# Patient Record
Sex: MALE
[De-identification: names, ages, dates, MRNs, and addresses within clinical notes are randomized per-mention and may not be internally consistent; named-entity substitution may affect disease eponyms.]

---

## 2023-03-08 ENCOUNTER — NURSE TRIAGE (OUTPATIENT)
Dept: OTHER | Facility: CLINIC | Age: 28
End: 2023-03-08

## 2023-03-08 NOTE — TELEPHONE ENCOUNTER
Caller stated he is currently in [place].     RN not licensed in [place].      Advised will see if a RN is available that can take his call.      No triage RN licensed in [place] is available.      Jeremiah aware and asking if he can get some name so PCP's in the [place] area that he can call to get established.     Provided caller with several names and numbers from O website provider search.     Reason for Disposition   General information question, no triage required and triager able to answer question    Protocols used: Information Only Call - No Triage-ADULT-

## 2024-07-14 ENCOUNTER — HOSPITAL ENCOUNTER (EMERGENCY)
Facility: HOSPITAL | Age: 29
Discharge: HOME | End: 2024-07-14
Attending: STUDENT IN AN ORGANIZED HEALTH CARE EDUCATION/TRAINING PROGRAM
Payer: COMMERCIAL

## 2024-07-14 ENCOUNTER — APPOINTMENT (OUTPATIENT)
Dept: CARDIOLOGY | Facility: HOSPITAL | Age: 29
End: 2024-07-14
Payer: COMMERCIAL

## 2024-07-14 VITALS
SYSTOLIC BLOOD PRESSURE: 133 MMHG | TEMPERATURE: 97.9 F | BODY MASS INDEX: 23.36 KG/M2 | DIASTOLIC BLOOD PRESSURE: 84 MMHG | HEIGHT: 74 IN | RESPIRATION RATE: 18 BRPM | OXYGEN SATURATION: 97 % | HEART RATE: 74 BPM | WEIGHT: 182 LBS

## 2024-07-14 DIAGNOSIS — F10.930 ALCOHOL WITHDRAWAL SYNDROME WITHOUT COMPLICATION (MULTI): ICD-10-CM

## 2024-07-14 DIAGNOSIS — F12.10 CANNABIS ABUSE: ICD-10-CM

## 2024-07-14 DIAGNOSIS — R25.1 TREMORS OF NERVOUS SYSTEM: ICD-10-CM

## 2024-07-14 DIAGNOSIS — F10.10 ALCOHOL ABUSE: Primary | ICD-10-CM

## 2024-07-14 LAB
ALBUMIN SERPL-MCNC: 4.4 G/DL (ref 3.5–5)
ALP BLD-CCNC: 66 U/L (ref 35–125)
ALT SERPL-CCNC: 31 U/L (ref 5–40)
AMPHETAMINES UR QL SCN>1000 NG/ML: NEGATIVE
ANION GAP SERPL CALC-SCNC: 16 MMOL/L
APAP SERPL-MCNC: <5 UG/ML
APPEARANCE UR: CLEAR
AST SERPL-CCNC: 35 U/L (ref 5–40)
BARBITURATES UR QL SCN>300 NG/ML: NEGATIVE
BASOPHILS # BLD AUTO: 0.03 X10*3/UL (ref 0–0.1)
BASOPHILS NFR BLD AUTO: 0.2 %
BENZODIAZ UR QL SCN>300 NG/ML: NEGATIVE
BILIRUB SERPL-MCNC: 0.5 MG/DL (ref 0.1–1.2)
BILIRUB UR STRIP.AUTO-MCNC: NEGATIVE MG/DL
BUN SERPL-MCNC: 12 MG/DL (ref 8–25)
BZE UR QL SCN>300 NG/ML: NEGATIVE
CALCIUM SERPL-MCNC: 9.5 MG/DL (ref 8.5–10.4)
CANNABINOIDS UR QL SCN>50 NG/ML: POSITIVE
CHLORIDE SERPL-SCNC: 102 MMOL/L (ref 97–107)
CO2 SERPL-SCNC: 23 MMOL/L (ref 24–31)
COLOR UR: NORMAL
CREAT SERPL-MCNC: 0.9 MG/DL (ref 0.4–1.6)
EGFRCR SERPLBLD CKD-EPI 2021: >90 ML/MIN/1.73M*2
EOSINOPHIL # BLD AUTO: 0 X10*3/UL (ref 0–0.7)
EOSINOPHIL NFR BLD AUTO: 0 %
ERYTHROCYTE [DISTWIDTH] IN BLOOD BY AUTOMATED COUNT: 12.3 % (ref 11.5–14.5)
ETHANOL SERPL-MCNC: <0.01 G/DL
ETHANOL SERPL-MCNC: <0.01 G/DL
FENTANYL+NORFENTANYL UR QL SCN: NEGATIVE
GLUCOSE SERPL-MCNC: 113 MG/DL (ref 65–99)
GLUCOSE UR STRIP.AUTO-MCNC: NORMAL MG/DL
HCT VFR BLD AUTO: 40.9 % (ref 41–52)
HGB BLD-MCNC: 14.2 G/DL (ref 13.5–17.5)
IMM GRANULOCYTES # BLD AUTO: 0.05 X10*3/UL (ref 0–0.7)
IMM GRANULOCYTES NFR BLD AUTO: 0.4 % (ref 0–0.9)
KETONES UR STRIP.AUTO-MCNC: NEGATIVE MG/DL
LEUKOCYTE ESTERASE UR QL STRIP.AUTO: NEGATIVE
LIPASE SERPL-CCNC: <16 U/L (ref 16–63)
LYMPHOCYTES # BLD AUTO: 0.53 X10*3/UL (ref 1.2–4.8)
LYMPHOCYTES NFR BLD AUTO: 4 %
MCH RBC QN AUTO: 30.1 PG (ref 26–34)
MCHC RBC AUTO-ENTMCNC: 34.7 G/DL (ref 32–36)
MCV RBC AUTO: 87 FL (ref 80–100)
METHADONE UR QL SCN>300 NG/ML: NEGATIVE
MONOCYTES # BLD AUTO: 1.04 X10*3/UL (ref 0.1–1)
MONOCYTES NFR BLD AUTO: 7.9 %
NEUTROPHILS # BLD AUTO: 11.48 X10*3/UL (ref 1.2–7.7)
NEUTROPHILS NFR BLD AUTO: 87.5 %
NITRITE UR QL STRIP.AUTO: NEGATIVE
NRBC BLD-RTO: 0 /100 WBCS (ref 0–0)
OPIATES UR QL SCN>300 NG/ML: NEGATIVE
OXYCODONE UR QL: NEGATIVE
PCP UR QL SCN>25 NG/ML: NEGATIVE
PH UR STRIP.AUTO: 8 [PH]
PLATELET # BLD AUTO: 241 X10*3/UL (ref 150–450)
POTASSIUM SERPL-SCNC: 3.9 MMOL/L (ref 3.4–5.1)
PROT SERPL-MCNC: 6.9 G/DL (ref 5.9–7.9)
PROT UR STRIP.AUTO-MCNC: NEGATIVE MG/DL
RBC # BLD AUTO: 4.72 X10*6/UL (ref 4.5–5.9)
RBC # UR STRIP.AUTO: NEGATIVE /UL
SALICYLATES SERPL-MCNC: <0 MG/DL
SODIUM SERPL-SCNC: 141 MMOL/L (ref 133–145)
SP GR UR STRIP.AUTO: 1.02
UROBILINOGEN UR STRIP.AUTO-MCNC: NORMAL MG/DL
WBC # BLD AUTO: 13.1 X10*3/UL (ref 4.4–11.3)

## 2024-07-14 PROCEDURE — 2500000004 HC RX 250 GENERAL PHARMACY W/ HCPCS (ALT 636 FOR OP/ED): Performed by: STUDENT IN AN ORGANIZED HEALTH CARE EDUCATION/TRAINING PROGRAM

## 2024-07-14 PROCEDURE — 36415 COLL VENOUS BLD VENIPUNCTURE: CPT

## 2024-07-14 PROCEDURE — 80143 DRUG ASSAY ACETAMINOPHEN: CPT

## 2024-07-14 PROCEDURE — 93005 ELECTROCARDIOGRAM TRACING: CPT

## 2024-07-14 PROCEDURE — 2500000002 HC RX 250 W HCPCS SELF ADMINISTERED DRUGS (ALT 637 FOR MEDICARE OP, ALT 636 FOR OP/ED): Performed by: STUDENT IN AN ORGANIZED HEALTH CARE EDUCATION/TRAINING PROGRAM

## 2024-07-14 PROCEDURE — 85025 COMPLETE CBC W/AUTO DIFF WBC: CPT | Performed by: STUDENT IN AN ORGANIZED HEALTH CARE EDUCATION/TRAINING PROGRAM

## 2024-07-14 PROCEDURE — 99284 EMERGENCY DEPT VISIT MOD MDM: CPT | Mod: 25

## 2024-07-14 PROCEDURE — 2500000001 HC RX 250 WO HCPCS SELF ADMINISTERED DRUGS (ALT 637 FOR MEDICARE OP)

## 2024-07-14 PROCEDURE — 96375 TX/PRO/DX INJ NEW DRUG ADDON: CPT

## 2024-07-14 PROCEDURE — 82077 ASSAY SPEC XCP UR&BREATH IA: CPT | Performed by: STUDENT IN AN ORGANIZED HEALTH CARE EDUCATION/TRAINING PROGRAM

## 2024-07-14 PROCEDURE — 81003 URINALYSIS AUTO W/O SCOPE: CPT | Performed by: STUDENT IN AN ORGANIZED HEALTH CARE EDUCATION/TRAINING PROGRAM

## 2024-07-14 PROCEDURE — 80053 COMPREHEN METABOLIC PANEL: CPT | Performed by: STUDENT IN AN ORGANIZED HEALTH CARE EDUCATION/TRAINING PROGRAM

## 2024-07-14 PROCEDURE — 36415 COLL VENOUS BLD VENIPUNCTURE: CPT | Performed by: STUDENT IN AN ORGANIZED HEALTH CARE EDUCATION/TRAINING PROGRAM

## 2024-07-14 PROCEDURE — 83690 ASSAY OF LIPASE: CPT | Performed by: STUDENT IN AN ORGANIZED HEALTH CARE EDUCATION/TRAINING PROGRAM

## 2024-07-14 PROCEDURE — 96374 THER/PROPH/DIAG INJ IV PUSH: CPT

## 2024-07-14 PROCEDURE — 96361 HYDRATE IV INFUSION ADD-ON: CPT

## 2024-07-14 PROCEDURE — 2500000004 HC RX 250 GENERAL PHARMACY W/ HCPCS (ALT 636 FOR OP/ED)

## 2024-07-14 PROCEDURE — 80307 DRUG TEST PRSMV CHEM ANLYZR: CPT | Performed by: STUDENT IN AN ORGANIZED HEALTH CARE EDUCATION/TRAINING PROGRAM

## 2024-07-14 RX ORDER — PHENOBARBITAL 32.4 MG/1
130 TABLET ORAL ONCE
Status: COMPLETED | OUTPATIENT
Start: 2024-07-14 | End: 2024-07-14

## 2024-07-14 RX ORDER — FOLIC ACID 1 MG/1
1 TABLET ORAL DAILY
Status: DISCONTINUED | OUTPATIENT
Start: 2024-07-14 | End: 2024-07-14 | Stop reason: HOSPADM

## 2024-07-14 RX ORDER — FAMOTIDINE 10 MG/ML
20 INJECTION INTRAVENOUS ONCE
Status: COMPLETED | OUTPATIENT
Start: 2024-07-14 | End: 2024-07-14

## 2024-07-14 RX ORDER — LORAZEPAM 2 MG/ML
2 INJECTION INTRAMUSCULAR EVERY 2 HOUR PRN
Status: DISCONTINUED | OUTPATIENT
Start: 2024-07-14 | End: 2024-07-14 | Stop reason: HOSPADM

## 2024-07-14 RX ORDER — SUCRALFATE 1 G/10ML
1 SUSPENSION ORAL EVERY 6 HOURS SCHEDULED
Status: DISCONTINUED | OUTPATIENT
Start: 2024-07-14 | End: 2024-07-14 | Stop reason: HOSPADM

## 2024-07-14 RX ORDER — ONDANSETRON HYDROCHLORIDE 2 MG/ML
4 INJECTION, SOLUTION INTRAVENOUS ONCE
Status: COMPLETED | OUTPATIENT
Start: 2024-07-14 | End: 2024-07-14

## 2024-07-14 RX ORDER — LANOLIN ALCOHOL/MO/W.PET/CERES
100 CREAM (GRAM) TOPICAL DAILY
Status: DISCONTINUED | OUTPATIENT
Start: 2024-07-14 | End: 2024-07-14 | Stop reason: HOSPADM

## 2024-07-14 RX ORDER — LORAZEPAM 2 MG/ML
0.5 INJECTION INTRAMUSCULAR EVERY 2 HOUR PRN
Status: DISCONTINUED | OUTPATIENT
Start: 2024-07-14 | End: 2024-07-14 | Stop reason: HOSPADM

## 2024-07-14 RX ORDER — LORAZEPAM 2 MG/ML
1 INJECTION INTRAMUSCULAR EVERY 2 HOUR PRN
Status: DISCONTINUED | OUTPATIENT
Start: 2024-07-14 | End: 2024-07-14 | Stop reason: HOSPADM

## 2024-07-14 RX ORDER — MULTIVIT-MIN/IRON FUM/FOLIC AC 7.5 MG-4
1 TABLET ORAL DAILY
Status: DISCONTINUED | OUTPATIENT
Start: 2024-07-14 | End: 2024-07-14 | Stop reason: HOSPADM

## 2024-07-14 SDOH — HEALTH STABILITY: MENTAL HEALTH: HAVE YOU WISHED YOU WERE DEAD OR WISHED YOU COULD GO TO SLEEP AND NOT WAKE UP?: NO

## 2024-07-14 SDOH — HEALTH STABILITY: MENTAL HEALTH: HAVE YOU ACTUALLY HAD ANY THOUGHTS OF KILLING YOURSELF?: NO

## 2024-07-14 SDOH — HEALTH STABILITY: MENTAL HEALTH: SUICIDE ASSESSMENT: ADULT (C-SSRS)

## 2024-07-14 SDOH — HEALTH STABILITY: MENTAL HEALTH: BEHAVIORS/MOOD: ANXIOUS

## 2024-07-14 SDOH — HEALTH STABILITY: MENTAL HEALTH: HAVE YOU EVER DONE ANYTHING, STARTED TO DO ANYTHING, OR PREPARED TO DO ANYTHING TO END YOUR LIFE?: NO

## 2024-07-14 ASSESSMENT — PAIN DESCRIPTION - LOCATION: LOCATION: ABDOMEN

## 2024-07-14 ASSESSMENT — LIFESTYLE VARIABLES
PAROXYSMAL SWEATS: BARELY PERCEPTIBLE SWEATING, PALMS MOIST
ANXIETY: MODERATELY ANXIOUS, OR GUARDED, SO ANXIETY IS INFERRED
VISUAL DISTURBANCES: NOT PRESENT
TOTAL SCORE: 10
TREMOR: 2
TOTAL SCORE: 5
AGITATION: NORMAL ACTIVITY
AUDITORY DISTURBANCES: NOT PRESENT
NAUSEA AND VOMITING: MILD NAUSEA WITH NO VOMITING
HEADACHE, FULLNESS IN HEAD: MILD
AGITATION: NORMAL ACTIVITY
ORIENTATION AND CLOUDING OF SENSORIUM: ORIENTED AND CAN DO SERIAL ADDITIONS
NAUSEA AND VOMITING: MILD NAUSEA WITH NO VOMITING
TACTILE DISTURBANCES: VERY MILD ITCHING, PINS AND NEEDLES, BURNING OR NUMBNESS
TREMOR: NOT VISIBLE, BUT CAN BE FELT FINGERTIP TO FINGERTIP
AUDITORY DISTURBANCES: NOT PRESENT
HEADACHE, FULLNESS IN HEAD: NOT PRESENT
PAROXYSMAL SWEATS: NO SWEAT VISIBLE
ANXIETY: 2
ORIENTATION AND CLOUDING OF SENSORIUM: ORIENTED AND CAN DO SERIAL ADDITIONS
VISUAL DISTURBANCES: NOT PRESENT

## 2024-07-14 ASSESSMENT — COLUMBIA-SUICIDE SEVERITY RATING SCALE - C-SSRS
1. IN THE PAST MONTH, HAVE YOU WISHED YOU WERE DEAD OR WISHED YOU COULD GO TO SLEEP AND NOT WAKE UP?: NO
2. HAVE YOU ACTUALLY HAD ANY THOUGHTS OF KILLING YOURSELF?: NO
6. HAVE YOU EVER DONE ANYTHING, STARTED TO DO ANYTHING, OR PREPARED TO DO ANYTHING TO END YOUR LIFE?: NO

## 2024-07-14 ASSESSMENT — PAIN SCALES - GENERAL: PAINLEVEL_OUTOF10: 4

## 2024-07-14 ASSESSMENT — PAIN - FUNCTIONAL ASSESSMENT: PAIN_FUNCTIONAL_ASSESSMENT: 0-10

## 2024-07-14 NOTE — PROGRESS NOTES
Social Work Note  11:20 Pt is a 29 y/o M presenting to [facility] ED for alcohol withdrawal complaints. Pt has history of alcohol abuse and reports last drink was yesterday afternoon around 14:00. Pt states he typically drinks a 12 pack of seltzers per day or vodka depending on what is available. His current symptoms are nausea, abdominal pain, sweating and anxiety. Pt also endorsing some tremors at this time. Pt reports he has been drinking for several years and has gone through inpatient treatment in [place] 2 months ago. He has been binge drinking for the past 6 days and now having withdrawal symptoms. Pt from [place] but stays with his parents in [place] when back in [facility]. He is unsure at this time if he is interested in inpatient treatment as he has financial stressors and does not want to miss a week's worth of work. EPAT provided pt with options for treatment including inpatient and outpatient resources which can be provided if pt decides to go home. EPAT will follow up with pt once a decision has been made. Per RN pt was provided fluids and medications to help with his symptoms at this time. Pt's CIWA score currently at 6.     12:45 EPAT spoke with pt again regarding options and plan of care. Pt is not interested in inpatient detox at this time and denies wanting any resources for outpatient stating he is going to get himself back to [place] for work and figure out outpatient treatment options when he gets there.   EPAT spoke with ED providers who are in agreement to discharge pt.

## 2024-07-14 NOTE — ED PROVIDER NOTES
HPI   Chief Complaint   Patient presents with    Alcohol Problem     Pt comes in reporting etoh WD, last drink was at 1400 yesterday (7/14), has been binge drinking for the last 6 days. Went to treatment 2 months ago, is not sure about inpt treatment today. Reports nausea and abd pain. Sweating and anxious. Denies SI/HI. Patient admits to a 12 pack of seltzers per day and some vodka.        Patient is a 28-year-old male with past medical history of alcohol abuse presenting with concerns for alcohol withdrawal.  States that he has been binge drinking for the last 6 days.  States his typical drink is either vodka or seltzers.  Endorses roughly a 12 pack of seltzers every day.  Currently endorsing epigastric abdominal pain with nausea and vomiting.  He states that he was inpatient for his alcohol problem roughly 2 months ago.  States he is from [place] and is unsure if he would like to go inpatient at this time.  Denies SI, HI, auditory, visual hallucinations.  Does endorse tremors.  Patient denies fevers, chills, cough, sore throat, runny nose, chest pain, shortness of breath, diarrhea or urinary complaints.                          [place] Coma Scale Score: 15                     Patient History   History reviewed. No pertinent past medical history.  History reviewed. No pertinent surgical history.  No family history on file.  Social History     Tobacco Use    Smoking status: Never    Smokeless tobacco: Never   Substance Use Topics    Alcohol use: Yes     Alcohol/week: 14.0 standard drinks of alcohol     Types: 12 Cans of beer, 2 Shots of liquor per week    Drug use: Yes     Types: Marijuana       Physical Exam   ED Triage Vitals [07/14/24 1012]   Temperature Heart Rate Respirations BP   36.6 °C (97.9 °F) 71 18 146/81      Pulse Ox Temp Source Heart Rate Source Patient Position   98 % Oral -- --      BP Location FiO2 (%)     -- --       Physical Exam  Vitals and nursing note reviewed.   Constitutional:        Appearance: He is well-developed.      Comments: Awake, laying in bed, mild tremors present   HENT:      Head: Normocephalic and atraumatic.      Nose: Nose normal.      Mouth/Throat:      Mouth: Mucous membranes are moist.      Pharynx: Oropharynx is clear.   Eyes:      Extraocular Movements: Extraocular movements intact.      Conjunctiva/sclera: Conjunctivae normal.      Pupils: Pupils are equal, round, and reactive to light.   Cardiovascular:      Rate and Rhythm: Normal rate and regular rhythm.      Pulses: Normal pulses.      Heart sounds: Normal heart sounds. No murmur heard.  Pulmonary:      Effort: Pulmonary effort is normal. No respiratory distress.      Breath sounds: Normal breath sounds.   Abdominal:      General: Abdomen is flat.      Palpations: Abdomen is soft.      Tenderness: There is abdominal tenderness.      Comments: Tenderness palpation of the epigastrium   Musculoskeletal:         General: No swelling. Normal range of motion.      Cervical back: Normal range of motion and neck supple.   Skin:     General: Skin is warm and dry.      Capillary Refill: Capillary refill takes less than 2 seconds.   Neurological:      General: No focal deficit present.      Mental Status: He is alert and oriented to person, place, and time.   Psychiatric:         Mood and Affect: Mood normal.         Behavior: Behavior normal.         ED Course & MDM   ED Course as of 07/14/24 1715   Sun Jul 14, 2024   1032 I personally reviewed and interpreted the EKG @1027: NSR 68, normal axis/intervals and no appreciable ischemia, and no prior EKG available for review. [BC]      ED Course User Index  [BC] Diony Laura MD         Diagnoses as of 07/14/24 1715   Alcohol abuse   Alcohol withdrawal syndrome without complication (Multi)   Tremors of nervous system   Cannabis abuse       Medical Decision Making  Patient is a 28-year-old male with past med history of alcohol abuse presenting with concerns for alcohol withdrawal.  " Psychiatric workup, [facility] protocol ordered.  IV fluids, Zofran ordered.  Social work consult placed.  Conditions considered include but not limited to: Alcohol withdrawal, substance withdrawal, alcohol intoxication.    I saw this patient in conjunction with Dr. Laura.  CBC does show leukocytosis with WBCs of 13.1 but is without signs of anemia.  CMP without significant electrolyte abnormality or renal impairment.  Ethanol level is within normal limits.  UA with micro is without infection.  Urine drug screen is positive for cannabinoid use.  Lipase within normal limits.  Acute toxicology panel is within normal limits.    Patient was seen by social work and the patient states that he does not wish to go inpatient for detox.  He wishes to be discharged.  Due to patient's normal ethanol level, patient can safely be discharged.  We did give phenobarbital in the emergency department noted strong encourage this patient to refrain from drinking as he states he has had withdrawal seizures in the past.     A disoposition of discharge is acceptable.  Return to the Emergency Department if new or worsening symptoms including headache, fever, chills, chest pain, shortness of breath, syncope, near syncope, abdominal pain, nausea, vomiting,  diarrhea, or worsening pain.  Patient is agreeable to disposition of discharge with follow-up with primary care in the next 1-2 days.  He states that he is going to attempt to refrain from drinking as he states the symptoms of intoxication and withdrawal are \"not worth\".    Portions of this note made with Dragon software, please be mindful of potential grammatical errors.        Medications   sodium chloride 0.9 % bolus 1,000 mL (0 mL intravenous Stopped 7/14/24 1121)   ondansetron (Zofran) injection 4 mg (4 mg intravenous Given 7/14/24 1028)   sodium chloride 0.9 % bolus 1,000 mL (0 mL intravenous Stopped 7/14/24 1121)   famotidine PF (Pepcid) injection 20 mg (20 mg intravenous Given " 7/14/24 1129)   PHENobarbitaL (Luminal) tablet 130 mg (130 mg oral Given 7/14/24 1331)         Labs Reviewed   CBC WITH AUTO DIFFERENTIAL - Abnormal       Result Value    WBC 13.1 (*)     nRBC 0.0      RBC 4.72      Hemoglobin 14.2      Hematocrit 40.9 (*)     MCV 87      MCH 30.1      MCHC 34.7      RDW 12.3      Platelets 241      Neutrophils % 87.5      Immature Granulocytes %, Automated 0.4      Lymphocytes % 4.0      Monocytes % 7.9      Eosinophils % 0.0      Basophils % 0.2      Neutrophils Absolute 11.48 (*)     Immature Granulocytes Absolute, Automated 0.05      Lymphocytes Absolute 0.53 (*)     Monocytes Absolute 1.04 (*)     Eosinophils Absolute 0.00      Basophils Absolute 0.03     COMPREHENSIVE METABOLIC PANEL - Abnormal    Glucose 113 (*)     Sodium 141      Potassium 3.9      Chloride 102      Bicarbonate 23 (*)     Urea Nitrogen 12      Creatinine 0.90      eGFR >90      Calcium 9.5      Albumin 4.4      Alkaline Phosphatase 66      Total Protein 6.9      AST 35      Bilirubin, Total 0.5      ALT 31      Anion Gap 16     DRUG SCREEN,URINE - Abnormal    Amphetamine Screen, Urine Negative      Barbiturate Screen, Urine Negative      Benzodiazepines Screen, Urine Negative      Cannabinoid Screen, Urine Positive (*)     Cocaine Metabolite Screen, Urine Negative      Fentanyl Screen, Urine Negative      Methadone Screen, Urine Negative      Opiate Screen, Urine Negative      Oxycodone Screen, Urine Negative      PCP Screen, Urine Negative      Narrative:     These toxicological screening tests provide unconfirmed qualitative measurements to aid in treatment and diagnosis in cases of drug use or overdose. This test is used only for medical purposes. A positive result does not indicate or measure intoxication. For specific test performance or pathologist consultation, please contact the Laboratory.    The following threshold concentrations are used for these analyses.Values at or above the threshold  concentration are reported as positive. Values below the threshold are reported as negative.    Drug /Screening Threshold                                                                                                 THC/CANNABINOIDS................50 ng/ml  METHADONE......................300 ng/ml  COCAINE METABOLITES............300 ng/ml  BENZODIAZEPINE.................300 ng/ml  PCP.............................25 ng/ml  OPIATE.........................300 ng/ml  AMPHETAMINE/ECSTASY...........1000 ng/ml  BARBITURATE....................200 ng/ml  OXYCODONE......................100 ng/ml  FENTANYL.........................5 ng/ml       LIPASE - Abnormal    Lipase <16 (*)    URINALYSIS WITH REFLEX MICROSCOPIC - Normal    Color, Urine Light-Yellow      Appearance, Urine Clear      Specific Gravity, Urine 1.017      pH, Urine 8.0      Protein, Urine NEGATIVE      Glucose, Urine Normal      Blood, Urine NEGATIVE      Ketones, Urine NEGATIVE      Bilirubin, Urine NEGATIVE      Urobilinogen, Urine Normal      Nitrite, Urine NEGATIVE      Leukocyte Esterase, Urine NEGATIVE     ALCOHOL - Normal    Alcohol <0.010     ACUTE TOXICOLOGY PANEL, BLOOD - Normal    Acetaminophen <5.0      Salicylate  <0      Alcohol <0.010         Procedure  Procedures     Sanjay Allen PA-C  07/14/24 9566

## 2024-07-14 NOTE — DISCHARGE INSTRUCTIONS
Thank you for allowing myself and the team to take care of you during your emergency situation and addressing your health concerns.    You were evaluated for alcohol withdrawal symptoms.     Please refrain from using any alcohol for the next 24 to 48 hours you are given medications and prevent you from having any alcohol withdrawal seizures.  Please utilize appropriate rehab/detox resources that was provided.      During your evaluation and assessment, all measures were taken to evaluate you and address your health concerns to identify dangerous and life threatening health conditions. It is not possible to identify all health conditions or pathologies and eliminate any chance of unfavorable outcomes while in the Emergency Department. My team encourages you to be vigilant with your health and follow-up with the appropriate providers outlined in your discharge paperwork. Please return to the Emergency Department if you feel that your health has not improved or experiencing worsening symptoms.    Special instructions no alcohol use for the next 24 to 48 hours.    Incidental findings:  None

## 2024-07-14 NOTE — TREATMENT PLAN
"The patient was seen in conjunction with the advanced practice provider, and I performed a substantive portion of the encounter. I personally saw the patient and made/approved the management plan and take complete responsibility for the patient management. I agree with the workup, evaluation, MDM, management and diagnosis of the patient stated in the advanced practice provider's note. All medical decision making was performed by me and communicated to the advanced practice provider. All laboratory studies, radiology, and EKGs were reviewed by me.     History:  Presents to the ED for alcohol withdrawal symptoms and requesting detox.  Notes he has been experiencing tremulous symptoms along with increased anxiousness, insomnia, epigastric pain.  Notes he drinks about a 12 pack/day and is experienced prior alcohol withdrawal symptoms and to reported alcohol withdrawal seizures.  Presently notes mild to moderate epigastric pain, nonradiating, no alleviating factors/medications, no aggravating factors.  Associated nausea without emesis.  Does not appreciate any abdominal distention and denies any abdominal surgical history.  Does describe melanic stools but states he has never been told he has hemorrhagic PUD.  Notes he is never been scoped for varices.  Denies any appreciable infectious symptoms or fever/chills.  Denies any urinary symptoms.    VS:  /84 (BP Location: Left arm, Patient Position: Lying)   Pulse 74   Temp 36.6 °C (97.9 °F) (Oral)   Resp 18   Ht 1.88 m (6' 2\")   Wt 82.6 kg (182 lb)   SpO2 97%   BMI 23.37 kg/m²       Physical exam:  CONST: alert, normal appearance, no acute distress, does not appear ill/toxic  HEAD: normocephalic, atraumatic  ENT: MMM, no rhinorrhea/congestion, posterior oropharynx clear and oral secretions well controlled  EYES: PEPRL, EOMI, no scleral icterus, no nystagmus  CV: RRR, no murmurs, 2+ equal/symmetrical pulses x4  PULM: CTAB, no respiratory distress, not requiring " supplental O2  ABD: soft, flat/non-tender/non-distended, no mass  SKIN: warm/dry, no pallor or jaundice, no rash  NEURO: A&Ox4, no facial asymmetry, no focal neuro deficits, gross strength/motor/sensation intact x4, normal gait  PSYCH: Appropriate affect      I personally reviewed and interpreted the following studies EKG and labs with the following interpretation: EKG is NSR 68, normal axis/intervals and no appreciable ischemia, labs are significant for EtOH undetectable, positive cannabinoid metabolites on tox screen, otherwise unremarkable, images are notable for N/A.        MDM:  Patient presented to the ED for EtOH withdrawal. Concerning PMHx of EtOH abuse/dependency.    Per Chart Review: Nothing pertinent to this ED encounter.    Assessment/evaluation EtOH withdrawal symptoms, uncomplicated, no evidence of delirium. No concerning history, clinical evidence/work-up, or exam findings for the concerning differentials of polysubstance use withdrawal, significant lecture light/metabolic derangement/encephalopathy, infectious process/encephalopathy. These conditions have been thoroughly evaluated and determined to be sufficiently unlikely to be the etiology of patient's presenting symptoms. After receiving an appropriate exam, clinical work-up, and necessary interventions/treatment, Patient is appropriate for discharge at this time due to no concerning symptoms or findings requiring hospitalization for stabilization or further interrogation/management and is appropriate for management of symptoms at home with recommended outpatient follow-up.  Patient was encouraged to ask any questions or for clarification of today's ED encounter.  Patient is agreeable to plan of care. Discussed with Patient today's results/findings and likely diagnosis, provided appropriate RTED precautions along with recommended follow-up with PCP.      Diagnosis:  1. Alcohol abuse        2. Alcohol withdrawal syndrome without complication  (Multi)        3. Tremors of nervous system        4. Cannabis abuse

## 2024-07-15 LAB
ATRIAL RATE: 68 BPM
P AXIS: 48 DEGREES
P OFFSET: 208 MS
P ONSET: 151 MS
PR INTERVAL: 138 MS
Q ONSET: 220 MS
QRS COUNT: 11 BEATS
QRS DURATION: 100 MS
QT INTERVAL: 412 MS
QTC CALCULATION(BAZETT): 438 MS
QTC FREDERICIA: 429 MS
R AXIS: 82 DEGREES
T AXIS: 57 DEGREES
T OFFSET: 426 MS
VENTRICULAR RATE: 68 BPM

## 2024-09-10 ENCOUNTER — HOSPITAL ENCOUNTER (EMERGENCY)
Facility: HOSPITAL | Age: 29
Discharge: HOME | End: 2024-09-10
Attending: STUDENT IN AN ORGANIZED HEALTH CARE EDUCATION/TRAINING PROGRAM
Payer: COMMERCIAL

## 2024-09-10 ENCOUNTER — APPOINTMENT (OUTPATIENT)
Dept: CARDIOLOGY | Facility: HOSPITAL | Age: 29
End: 2024-09-10
Payer: COMMERCIAL

## 2024-09-10 VITALS
TEMPERATURE: 97.6 F | HEIGHT: 74 IN | BODY MASS INDEX: 24.11 KG/M2 | DIASTOLIC BLOOD PRESSURE: 82 MMHG | RESPIRATION RATE: 18 BRPM | OXYGEN SATURATION: 97 % | SYSTOLIC BLOOD PRESSURE: 133 MMHG | WEIGHT: 187.9 LBS | HEART RATE: 65 BPM

## 2024-09-10 DIAGNOSIS — F10.930 ALCOHOL WITHDRAWAL SYNDROME WITHOUT COMPLICATION (MULTI): Primary | ICD-10-CM

## 2024-09-10 LAB
ALBUMIN SERPL-MCNC: 4.4 G/DL (ref 3.5–5)
ALP BLD-CCNC: 68 U/L (ref 35–125)
ALT SERPL-CCNC: 39 U/L (ref 5–40)
AMPHETAMINES UR QL SCN>1000 NG/ML: NEGATIVE
ANION GAP SERPL CALC-SCNC: 12 MMOL/L
APAP SERPL-MCNC: <5 UG/ML
AST SERPL-CCNC: 34 U/L (ref 5–40)
BARBITURATES UR QL SCN>300 NG/ML: NEGATIVE
BASOPHILS # BLD AUTO: 0.02 X10*3/UL (ref 0–0.1)
BASOPHILS NFR BLD AUTO: 0.2 %
BENZODIAZ UR QL SCN>300 NG/ML: NEGATIVE
BILIRUB SERPL-MCNC: 0.8 MG/DL (ref 0.1–1.2)
BUN SERPL-MCNC: 9 MG/DL (ref 8–25)
BZE UR QL SCN>300 NG/ML: NEGATIVE
CALCIUM SERPL-MCNC: 9.7 MG/DL (ref 8.5–10.4)
CANNABINOIDS UR QL SCN>50 NG/ML: POSITIVE
CHLORIDE SERPL-SCNC: 102 MMOL/L (ref 97–107)
CO2 SERPL-SCNC: 22 MMOL/L (ref 24–31)
CREAT SERPL-MCNC: 0.9 MG/DL (ref 0.4–1.6)
EGFRCR SERPLBLD CKD-EPI 2021: >90 ML/MIN/1.73M*2
EOSINOPHIL # BLD AUTO: 0.01 X10*3/UL (ref 0–0.7)
EOSINOPHIL NFR BLD AUTO: 0.1 %
ERYTHROCYTE [DISTWIDTH] IN BLOOD BY AUTOMATED COUNT: 13.2 % (ref 11.5–14.5)
ETHANOL SERPL-MCNC: <0.01 G/DL
FENTANYL+NORFENTANYL UR QL SCN: NEGATIVE
GLUCOSE SERPL-MCNC: 110 MG/DL (ref 65–99)
HCT VFR BLD AUTO: 41 % (ref 41–52)
HGB BLD-MCNC: 14.7 G/DL (ref 13.5–17.5)
IMM GRANULOCYTES # BLD AUTO: 0.03 X10*3/UL (ref 0–0.7)
IMM GRANULOCYTES NFR BLD AUTO: 0.3 % (ref 0–0.9)
LYMPHOCYTES # BLD AUTO: 0.81 X10*3/UL (ref 1.2–4.8)
LYMPHOCYTES NFR BLD AUTO: 8.9 %
MCH RBC QN AUTO: 30.1 PG (ref 26–34)
MCHC RBC AUTO-ENTMCNC: 35.9 G/DL (ref 32–36)
MCV RBC AUTO: 84 FL (ref 80–100)
METHADONE UR QL SCN>300 NG/ML: NEGATIVE
MONOCYTES # BLD AUTO: 0.91 X10*3/UL (ref 0.1–1)
MONOCYTES NFR BLD AUTO: 10 %
NEUTROPHILS # BLD AUTO: 7.36 X10*3/UL (ref 1.2–7.7)
NEUTROPHILS NFR BLD AUTO: 80.5 %
NRBC BLD-RTO: 0 /100 WBCS (ref 0–0)
OPIATES UR QL SCN>300 NG/ML: NEGATIVE
OXYCODONE UR QL: NEGATIVE
PCP UR QL SCN>25 NG/ML: NEGATIVE
PLATELET # BLD AUTO: 241 X10*3/UL (ref 150–450)
POTASSIUM SERPL-SCNC: 3.7 MMOL/L (ref 3.4–5.1)
PROT SERPL-MCNC: 7.2 G/DL (ref 5.9–7.9)
RBC # BLD AUTO: 4.88 X10*6/UL (ref 4.5–5.9)
SALICYLATES SERPL-MCNC: <0 MG/DL
SODIUM SERPL-SCNC: 136 MMOL/L (ref 133–145)
WBC # BLD AUTO: 9.1 X10*3/UL (ref 4.4–11.3)

## 2024-09-10 PROCEDURE — 93005 ELECTROCARDIOGRAM TRACING: CPT

## 2024-09-10 PROCEDURE — 85025 COMPLETE CBC W/AUTO DIFF WBC: CPT | Performed by: STUDENT IN AN ORGANIZED HEALTH CARE EDUCATION/TRAINING PROGRAM

## 2024-09-10 PROCEDURE — 96374 THER/PROPH/DIAG INJ IV PUSH: CPT

## 2024-09-10 PROCEDURE — 2500000005 HC RX 250 GENERAL PHARMACY W/O HCPCS: Performed by: STUDENT IN AN ORGANIZED HEALTH CARE EDUCATION/TRAINING PROGRAM

## 2024-09-10 PROCEDURE — 36415 COLL VENOUS BLD VENIPUNCTURE: CPT | Performed by: STUDENT IN AN ORGANIZED HEALTH CARE EDUCATION/TRAINING PROGRAM

## 2024-09-10 PROCEDURE — 80143 DRUG ASSAY ACETAMINOPHEN: CPT | Performed by: STUDENT IN AN ORGANIZED HEALTH CARE EDUCATION/TRAINING PROGRAM

## 2024-09-10 PROCEDURE — 2500000004 HC RX 250 GENERAL PHARMACY W/ HCPCS (ALT 636 FOR OP/ED): Performed by: STUDENT IN AN ORGANIZED HEALTH CARE EDUCATION/TRAINING PROGRAM

## 2024-09-10 PROCEDURE — 99284 EMERGENCY DEPT VISIT MOD MDM: CPT | Mod: 25

## 2024-09-10 PROCEDURE — 2500000001 HC RX 250 WO HCPCS SELF ADMINISTERED DRUGS (ALT 637 FOR MEDICARE OP): Performed by: STUDENT IN AN ORGANIZED HEALTH CARE EDUCATION/TRAINING PROGRAM

## 2024-09-10 PROCEDURE — 96361 HYDRATE IV INFUSION ADD-ON: CPT

## 2024-09-10 PROCEDURE — 80307 DRUG TEST PRSMV CHEM ANLYZR: CPT | Performed by: STUDENT IN AN ORGANIZED HEALTH CARE EDUCATION/TRAINING PROGRAM

## 2024-09-10 PROCEDURE — 80053 COMPREHEN METABOLIC PANEL: CPT | Performed by: STUDENT IN AN ORGANIZED HEALTH CARE EDUCATION/TRAINING PROGRAM

## 2024-09-10 RX ORDER — ONDANSETRON 4 MG/1
4 TABLET, ORALLY DISINTEGRATING ORAL EVERY 8 HOURS PRN
Qty: 20 TABLET | Refills: 0 | Status: SHIPPED | OUTPATIENT
Start: 2024-09-10 | End: 2024-09-17

## 2024-09-10 RX ORDER — ONDANSETRON 4 MG/1
4 TABLET, ORALLY DISINTEGRATING ORAL ONCE
Status: COMPLETED | OUTPATIENT
Start: 2024-09-10 | End: 2024-09-10

## 2024-09-10 RX ORDER — FOLIC ACID 1 MG/1
1 TABLET ORAL DAILY
Status: DISCONTINUED | OUTPATIENT
Start: 2024-09-10 | End: 2024-09-10 | Stop reason: HOSPADM

## 2024-09-10 RX ORDER — CHLORDIAZEPOXIDE HYDROCHLORIDE 25 MG/1
CAPSULE, GELATIN COATED ORAL
Qty: 15 CAPSULE | Refills: 0 | Status: SHIPPED | OUTPATIENT
Start: 2024-09-10 | End: 2024-09-13

## 2024-09-10 RX ORDER — LANOLIN ALCOHOL/MO/W.PET/CERES
500 CREAM (GRAM) TOPICAL ONCE
Status: COMPLETED | OUTPATIENT
Start: 2024-09-10 | End: 2024-09-10

## 2024-09-10 RX ORDER — PHENOBARBITAL SODIUM 65 MG/ML
260 INJECTION, SOLUTION INTRAMUSCULAR; INTRAVENOUS ONCE
Status: COMPLETED | OUTPATIENT
Start: 2024-09-10 | End: 2024-09-10

## 2024-09-10 RX ORDER — MULTIVIT-MIN/IRON FUM/FOLIC AC 7.5 MG-4
1 TABLET ORAL DAILY
Status: DISCONTINUED | OUTPATIENT
Start: 2024-09-10 | End: 2024-09-10

## 2024-09-10 RX ORDER — MULTIVIT-MIN/IRON FUM/FOLIC AC 7.5 MG-4
1 TABLET ORAL DAILY
Status: DISCONTINUED | OUTPATIENT
Start: 2024-09-10 | End: 2024-09-10 | Stop reason: HOSPADM

## 2024-09-10 RX ORDER — FOLIC ACID 1 MG/1
1 TABLET ORAL DAILY
Status: DISCONTINUED | OUTPATIENT
Start: 2024-09-10 | End: 2024-09-10

## 2024-09-10 ASSESSMENT — LIFESTYLE VARIABLES
PAROXYSMAL SWEATS: NO SWEAT VISIBLE
BLOOD PRESSURE: 133/82
NAUSEA AND VOMITING: MILD NAUSEA WITH NO VOMITING
HEADACHE, FULLNESS IN HEAD: NOT PRESENT
PULSE: 65
ORIENTATION AND CLOUDING OF SENSORIUM: ORIENTED AND CAN DO SERIAL ADDITIONS
AGITATION: NORMAL ACTIVITY
ANXIETY: NO ANXIETY, AT EASE
AUDITORY DISTURBANCES: NOT PRESENT
TREMOR: NOT VISIBLE, BUT CAN BE FELT FINGERTIP TO FINGERTIP
VISUAL DISTURBANCES: NOT PRESENT
TOTAL SCORE: 3
TACTILE DISTURBANCES: VERY MILD ITCHING, PINS AND NEEDLES, BURNING OR NUMBNESS

## 2024-09-10 ASSESSMENT — PAIN - FUNCTIONAL ASSESSMENT: PAIN_FUNCTIONAL_ASSESSMENT: 0-10

## 2024-09-10 ASSESSMENT — COLUMBIA-SUICIDE SEVERITY RATING SCALE - C-SSRS
6. HAVE YOU EVER DONE ANYTHING, STARTED TO DO ANYTHING, OR PREPARED TO DO ANYTHING TO END YOUR LIFE?: NO
1. IN THE PAST MONTH, HAVE YOU WISHED YOU WERE DEAD OR WISHED YOU COULD GO TO SLEEP AND NOT WAKE UP?: NO
2. HAVE YOU ACTUALLY HAD ANY THOUGHTS OF KILLING YOURSELF?: NO

## 2024-09-10 ASSESSMENT — PAIN DESCRIPTION - ONSET: ONSET: ONGOING

## 2024-09-10 ASSESSMENT — PAIN DESCRIPTION - ORIENTATION: ORIENTATION: UPPER

## 2024-09-10 ASSESSMENT — PAIN DESCRIPTION - LOCATION: LOCATION: ABDOMEN

## 2024-09-10 ASSESSMENT — PAIN DESCRIPTION - PROGRESSION: CLINICAL_PROGRESSION: NOT CHANGED

## 2024-09-10 ASSESSMENT — PAIN DESCRIPTION - FREQUENCY: FREQUENCY: CONSTANT/CONTINUOUS

## 2024-09-10 ASSESSMENT — PAIN DESCRIPTION - DESCRIPTORS: DESCRIPTORS: DISCOMFORT;PRESSURE

## 2024-09-10 ASSESSMENT — PAIN SCALES - GENERAL: PAINLEVEL_OUTOF10: 5 - MODERATE PAIN

## 2024-09-10 NOTE — DISCHARGE INSTRUCTIONS
You are seen in the emergency department today for symptoms related alcohol withdrawal.  We treated it with phenobarbital as well as provided you with vitamins related to chronic alcohol use.  I have provided you with a prescription for a Librium taper as well as for Zofran.  Please utilize the resources provided to you by social work.  If you have any other concerns, you can return to the emergency department for reevaluation.

## 2024-09-10 NOTE — ED PROVIDER NOTES
History of Present Illness     History provided by: Patient  Limitations to History: None  External Records Reviewed with Brief Summary:  Prior ED visit    HPI:  Hong Guerra is a 28 y.o. male presents with alcohol withdrawal.  Last drink was 3 days ago.  Normally drinks 750ml of vodka daily, then switched to 2 boxed gian daily.  Difficulty sleeping.  Nausea, vomiting, abdominal pain.  Notes worsening anxiety and tremor.  Hx of seizures with withdrawal, prodrome was numbness to his hands and fingers.  Did have numbness and tingling to his hands and fingers today prompting ED visit. Last tried to quit drinking in March.  Not interested in attending detox today but is requesting resources.     Physical Exam   Triage vitals:  T 36.4 °C (97.6 °F)  HR 62  /82  RR 18  O2 97 % None (Room air)    Physical Exam  Vitals and nursing note reviewed.   Constitutional:       General: He is not in acute distress.     Appearance: He is not ill-appearing.   HENT:      Head: Normocephalic and atraumatic.      Mouth/Throat:      Mouth: Mucous membranes are moist.      Pharynx: Oropharynx is clear.   Eyes:      Extraocular Movements: Extraocular movements intact.      Conjunctiva/sclera: Conjunctivae normal.      Pupils: Pupils are equal, round, and reactive to light.   Cardiovascular:      Rate and Rhythm: Normal rate and regular rhythm.   Pulmonary:      Effort: Pulmonary effort is normal. No respiratory distress.      Breath sounds: Normal breath sounds.   Abdominal:      General: There is no distension.      Palpations: Abdomen is soft.      Tenderness: There is no abdominal tenderness.   Musculoskeletal:         General: No swelling or deformity. Normal range of motion.      Cervical back: Normal range of motion and neck supple.   Skin:     General: Skin is warm and dry.      Capillary Refill: Capillary refill takes less than 2 seconds.   Neurological:      General: No focal deficit present.      Mental Status: He is  alert and oriented to person, place, and time. Mental status is at baseline.      Cranial Nerves: Cranial nerves 2-12 are intact.      Sensory: Sensation is intact.      Motor: Tremor present.      Gait: Gait is intact.      Comments: Tongue fasciculations noted   Psychiatric:         Mood and Affect: Mood normal.         Behavior: Behavior normal.          Medical Decision Making & ED Course   Medical Decision Makin y.o. male presents with alcohol withdrawal.  Considered alcohol tox occasion, withdrawal symptoms, substance dependence.  Patient with stable vital signs however noted to have tongue fasciculations and symptoms concerning for possible imminent seizure.  Patient treated with 260 IV phenobarbital as well as 1 L IV fluids given reports of persistent vomiting.  Labs ordered given possibility of patient wanting to go inpatient for detox.  Labs overall reassuring.  Spoke with  who provided the patient with outpatient resources.  Patient does not want to go to inpatient detox at this time as he needs to organize several things regarding his employment.  At this time will discharge patient with a Librium taper as well as with oral Zofran.  Patient plans to see a therapist later today and review his options for outpatient care.  Patient discharged in stable condition.  ----  Social Determinants of Health which Significantly Impact Care: Substance use disorder The following actions were taken to address these social determinants: Patient offered evaluation by Social Work    EKG Independent Interpretation: EKG interpreted by myself. Please see ED Course for full interpretation.    Independent Result Review and Interpretation: Relevant laboratory and radiographic results were reviewed and independently interpreted by myself.  As necessary, they are commented on in the ED Course.    Chronic conditions affecting the patient's care: As documented above in MDM    The patient was discussed with the  following consultants/services:      Care Considerations: As documented above in [facility]    ED Course:  ED Course as of 09/10/24 0604   Tue Sep 10, 2024   0453 Electrocardiogram, 12-lead  Performed at 0452, HR of 48, NSR, NAD, QTc 393, no sign of STEMI, no Q wave or T wave abnormality noted.    Reviewed and interpreted by me at time performed   [JM]      ED Course User Index  [JM] Kellen Silverio MD         Diagnoses as of 09/10/24 0604   Alcohol withdrawal syndrome without complication (Multi)       Procedures   Procedures    Kellen Silverio MD  Emergency Medicine     Kellen Silverio MD  09/10/24 0605

## 2024-09-10 NOTE — PROGRESS NOTES
05:30-05:33 SW was consulted by ED Doc Jean Claude and ED RN Rodney. Pt drinks and does not want inpatient treatment services but is interested in outpt ambulatory detox info.    05:35-05:45 SWEETIE met with pt and his mom (at bedside). SW offered [facility] outpt ambulatory detox services, [facility] PHP/IOP flyer as well as [facility] Outpt MH services. Pt accepted resources.

## 2024-09-11 LAB
ATRIAL RATE: 48 BPM
P AXIS: 25 DEGREES
P OFFSET: 202 MS
P ONSET: 161 MS
PR INTERVAL: 116 MS
Q ONSET: 219 MS
QRS COUNT: 8 BEATS
QRS DURATION: 88 MS
QT INTERVAL: 440 MS
QTC CALCULATION(BAZETT): 393 MS
QTC FREDERICIA: 408 MS
R AXIS: 77 DEGREES
T AXIS: 68 DEGREES
T OFFSET: 439 MS
VENTRICULAR RATE: 48 BPM

## 2025-02-14 ENCOUNTER — APPOINTMENT (OUTPATIENT)
Dept: RADIOLOGY | Facility: HOSPITAL | Age: 30
End: 2025-02-14

## 2025-02-14 ENCOUNTER — HOSPITAL ENCOUNTER (EMERGENCY)
Facility: HOSPITAL | Age: 30
Discharge: HOME | End: 2025-02-14
Attending: STUDENT IN AN ORGANIZED HEALTH CARE EDUCATION/TRAINING PROGRAM
Payer: COMMERCIAL

## 2025-02-14 VITALS
RESPIRATION RATE: 16 BRPM | WEIGHT: 210 LBS | HEART RATE: 57 BPM | TEMPERATURE: 97.5 F | SYSTOLIC BLOOD PRESSURE: 155 MMHG | HEIGHT: 74 IN | DIASTOLIC BLOOD PRESSURE: 99 MMHG | BODY MASS INDEX: 26.95 KG/M2 | OXYGEN SATURATION: 98 %

## 2025-02-14 DIAGNOSIS — R11.2 NAUSEA AND VOMITING, UNSPECIFIED VOMITING TYPE: ICD-10-CM

## 2025-02-14 DIAGNOSIS — R10.13 ABDOMINAL PAIN, EPIGASTRIC: Primary | ICD-10-CM

## 2025-02-14 DIAGNOSIS — F10.930 ALCOHOL WITHDRAWAL SYNDROME WITHOUT COMPLICATION (MULTI): ICD-10-CM

## 2025-02-14 LAB
ALBUMIN SERPL BCP-MCNC: 4.6 G/DL (ref 3.4–5)
ALP SERPL-CCNC: 59 U/L (ref 33–120)
ALT SERPL W P-5'-P-CCNC: 23 U/L (ref 10–52)
ANION GAP SERPL CALCULATED.3IONS-SCNC: 26 MMOL/L (ref 10–20)
APPEARANCE UR: CLEAR
AST SERPL W P-5'-P-CCNC: 23 U/L (ref 9–39)
BASOPHILS # BLD AUTO: 0.03 X10*3/UL (ref 0–0.1)
BASOPHILS NFR BLD AUTO: 0.5 %
BILIRUB SERPL-MCNC: 1.1 MG/DL (ref 0–1.2)
BILIRUB UR STRIP.AUTO-MCNC: NEGATIVE MG/DL
BUN SERPL-MCNC: 15 MG/DL (ref 6–23)
CALCIUM SERPL-MCNC: 9.9 MG/DL (ref 8.6–10.3)
CHLORIDE SERPL-SCNC: 97 MMOL/L (ref 98–107)
CO2 SERPL-SCNC: 24 MMOL/L (ref 21–32)
COLOR UR: ABNORMAL
CREAT SERPL-MCNC: 0.83 MG/DL (ref 0.5–1.3)
EGFRCR SERPLBLD CKD-EPI 2021: >90 ML/MIN/1.73M*2
EOSINOPHIL # BLD AUTO: 0 X10*3/UL (ref 0–0.7)
EOSINOPHIL NFR BLD AUTO: 0 %
ERYTHROCYTE [DISTWIDTH] IN BLOOD BY AUTOMATED COUNT: 12.9 % (ref 11.5–14.5)
FLUAV RNA RESP QL NAA+PROBE: NOT DETECTED
FLUBV RNA RESP QL NAA+PROBE: NOT DETECTED
GLUCOSE SERPL-MCNC: 115 MG/DL (ref 74–99)
GLUCOSE UR STRIP.AUTO-MCNC: NORMAL MG/DL
HCT VFR BLD AUTO: 42.5 % (ref 41–52)
HGB BLD-MCNC: 15.1 G/DL (ref 13.5–17.5)
IMM GRANULOCYTES # BLD AUTO: 0.01 X10*3/UL (ref 0–0.7)
IMM GRANULOCYTES NFR BLD AUTO: 0.2 % (ref 0–0.9)
KETONES UR STRIP.AUTO-MCNC: ABNORMAL MG/DL
LEUKOCYTE ESTERASE UR QL STRIP.AUTO: NEGATIVE
LIPASE SERPL-CCNC: 8 U/L (ref 9–82)
LYMPHOCYTES # BLD AUTO: 0.63 X10*3/UL (ref 1.2–4.8)
LYMPHOCYTES NFR BLD AUTO: 10.8 %
MAGNESIUM SERPL-MCNC: 1.82 MG/DL (ref 1.6–2.4)
MCH RBC QN AUTO: 29.8 PG (ref 26–34)
MCHC RBC AUTO-ENTMCNC: 35.5 G/DL (ref 32–36)
MCV RBC AUTO: 84 FL (ref 80–100)
MONOCYTES # BLD AUTO: 0.47 X10*3/UL (ref 0.1–1)
MONOCYTES NFR BLD AUTO: 8.1 %
MUCOUS THREADS #/AREA URNS AUTO: NORMAL /LPF
NEUTROPHILS # BLD AUTO: 4.69 X10*3/UL (ref 1.2–7.7)
NEUTROPHILS NFR BLD AUTO: 80.4 %
NITRITE UR QL STRIP.AUTO: NEGATIVE
NRBC BLD-RTO: 0 /100 WBCS (ref 0–0)
PH UR STRIP.AUTO: 7.5 [PH]
PLATELET # BLD AUTO: 229 X10*3/UL (ref 150–450)
POTASSIUM SERPL-SCNC: 4.2 MMOL/L (ref 3.5–5.3)
PROT SERPL-MCNC: 7.4 G/DL (ref 6.4–8.2)
PROT UR STRIP.AUTO-MCNC: ABNORMAL MG/DL
RBC # BLD AUTO: 5.06 X10*6/UL (ref 4.5–5.9)
RBC # UR STRIP.AUTO: NEGATIVE MG/DL
RBC #/AREA URNS AUTO: NORMAL /HPF
SARS-COV-2 RNA RESP QL NAA+PROBE: NOT DETECTED
SODIUM SERPL-SCNC: 143 MMOL/L (ref 136–145)
SP GR UR STRIP.AUTO: >1.05
UROBILINOGEN UR STRIP.AUTO-MCNC: NORMAL MG/DL
WBC # BLD AUTO: 5.8 X10*3/UL (ref 4.4–11.3)
WBC #/AREA URNS AUTO: NORMAL /HPF

## 2025-02-14 PROCEDURE — 85025 COMPLETE CBC W/AUTO DIFF WBC: CPT | Performed by: PHYSICIAN ASSISTANT

## 2025-02-14 PROCEDURE — 96375 TX/PRO/DX INJ NEW DRUG ADDON: CPT

## 2025-02-14 PROCEDURE — 85025 COMPLETE CBC W/AUTO DIFF WBC: CPT | Performed by: STUDENT IN AN ORGANIZED HEALTH CARE EDUCATION/TRAINING PROGRAM

## 2025-02-14 PROCEDURE — 80053 COMPREHEN METABOLIC PANEL: CPT | Performed by: PHYSICIAN ASSISTANT

## 2025-02-14 PROCEDURE — 83735 ASSAY OF MAGNESIUM: CPT | Performed by: STUDENT IN AN ORGANIZED HEALTH CARE EDUCATION/TRAINING PROGRAM

## 2025-02-14 PROCEDURE — 96374 THER/PROPH/DIAG INJ IV PUSH: CPT | Mod: 59

## 2025-02-14 PROCEDURE — 83735 ASSAY OF MAGNESIUM: CPT | Performed by: PHYSICIAN ASSISTANT

## 2025-02-14 PROCEDURE — 96372 THER/PROPH/DIAG INJ SC/IM: CPT | Performed by: STUDENT IN AN ORGANIZED HEALTH CARE EDUCATION/TRAINING PROGRAM

## 2025-02-14 PROCEDURE — 96372 THER/PROPH/DIAG INJ SC/IM: CPT | Performed by: PHYSICIAN ASSISTANT

## 2025-02-14 PROCEDURE — 87636 SARSCOV2 & INF A&B AMP PRB: CPT | Performed by: PHYSICIAN ASSISTANT

## 2025-02-14 PROCEDURE — 96361 HYDRATE IV INFUSION ADD-ON: CPT

## 2025-02-14 PROCEDURE — 74177 CT ABD & PELVIS W/CONTRAST: CPT | Performed by: STUDENT IN AN ORGANIZED HEALTH CARE EDUCATION/TRAINING PROGRAM

## 2025-02-14 PROCEDURE — 81001 URINALYSIS AUTO W/SCOPE: CPT | Performed by: PHYSICIAN ASSISTANT

## 2025-02-14 PROCEDURE — 36415 COLL VENOUS BLD VENIPUNCTURE: CPT | Performed by: PHYSICIAN ASSISTANT

## 2025-02-14 PROCEDURE — 80053 COMPREHEN METABOLIC PANEL: CPT | Performed by: STUDENT IN AN ORGANIZED HEALTH CARE EDUCATION/TRAINING PROGRAM

## 2025-02-14 PROCEDURE — 99285 EMERGENCY DEPT VISIT HI MDM: CPT | Mod: 25 | Performed by: STUDENT IN AN ORGANIZED HEALTH CARE EDUCATION/TRAINING PROGRAM

## 2025-02-14 PROCEDURE — 2500000001 HC RX 250 WO HCPCS SELF ADMINISTERED DRUGS (ALT 637 FOR MEDICARE OP): Performed by: PHYSICIAN ASSISTANT

## 2025-02-14 PROCEDURE — 2500000004 HC RX 250 GENERAL PHARMACY W/ HCPCS (ALT 636 FOR OP/ED): Performed by: STUDENT IN AN ORGANIZED HEALTH CARE EDUCATION/TRAINING PROGRAM

## 2025-02-14 PROCEDURE — 2500000004 HC RX 250 GENERAL PHARMACY W/ HCPCS (ALT 636 FOR OP/ED): Performed by: PHYSICIAN ASSISTANT

## 2025-02-14 PROCEDURE — 74177 CT ABD & PELVIS W/CONTRAST: CPT

## 2025-02-14 PROCEDURE — 83690 ASSAY OF LIPASE: CPT | Performed by: PHYSICIAN ASSISTANT

## 2025-02-14 PROCEDURE — 2550000001 HC RX 255 CONTRASTS: Performed by: PHYSICIAN ASSISTANT

## 2025-02-14 PROCEDURE — 83690 ASSAY OF LIPASE: CPT | Performed by: STUDENT IN AN ORGANIZED HEALTH CARE EDUCATION/TRAINING PROGRAM

## 2025-02-14 RX ORDER — LORAZEPAM 2 MG/ML
1 INJECTION INTRAMUSCULAR EVERY 2 HOUR PRN
Status: DISCONTINUED | OUTPATIENT
Start: 2025-02-14 | End: 2025-02-14 | Stop reason: ALTCHOICE

## 2025-02-14 RX ORDER — PHENOBARBITAL SODIUM 65 MG/ML
260 INJECTION, SOLUTION INTRAMUSCULAR; INTRAVENOUS ONCE
Status: COMPLETED | OUTPATIENT
Start: 2025-02-14 | End: 2025-02-14

## 2025-02-14 RX ORDER — KETOROLAC TROMETHAMINE 30 MG/ML
15 INJECTION, SOLUTION INTRAMUSCULAR; INTRAVENOUS ONCE
Status: COMPLETED | OUTPATIENT
Start: 2025-02-14 | End: 2025-02-14

## 2025-02-14 RX ORDER — PHENOBARBITAL 64.8 MG/1
TABLET ORAL
Qty: 9 TABLET | Refills: 0 | Status: SHIPPED | OUTPATIENT
Start: 2025-02-14 | End: 2025-02-18

## 2025-02-14 RX ORDER — ONDANSETRON 4 MG/1
4 TABLET, ORALLY DISINTEGRATING ORAL EVERY 8 HOURS PRN
Qty: 20 TABLET | Refills: 0 | Status: SHIPPED | OUTPATIENT
Start: 2025-02-14 | End: 2025-02-21

## 2025-02-14 RX ORDER — LORAZEPAM 2 MG/ML
0.5 INJECTION INTRAMUSCULAR EVERY 2 HOUR PRN
Status: DISCONTINUED | OUTPATIENT
Start: 2025-02-14 | End: 2025-02-14 | Stop reason: ALTCHOICE

## 2025-02-14 RX ORDER — ONDANSETRON HYDROCHLORIDE 2 MG/ML
4 INJECTION, SOLUTION INTRAVENOUS ONCE
Status: COMPLETED | OUTPATIENT
Start: 2025-02-14 | End: 2025-02-14

## 2025-02-14 RX ORDER — DICYCLOMINE HYDROCHLORIDE 10 MG/ML
20 INJECTION INTRAMUSCULAR ONCE
Status: COMPLETED | OUTPATIENT
Start: 2025-02-14 | End: 2025-02-14

## 2025-02-14 RX ORDER — LORAZEPAM 2 MG/ML
2 INJECTION INTRAMUSCULAR EVERY 2 HOUR PRN
Status: DISCONTINUED | OUTPATIENT
Start: 2025-02-14 | End: 2025-02-14 | Stop reason: ALTCHOICE

## 2025-02-14 RX ORDER — PHENOBARBITAL SODIUM 65 MG/ML
130 INJECTION, SOLUTION INTRAMUSCULAR; INTRAVENOUS ONCE
Status: COMPLETED | OUTPATIENT
Start: 2025-02-14 | End: 2025-02-14

## 2025-02-14 RX ORDER — PHENOBARBITAL SODIUM 65 MG/ML
260 INJECTION, SOLUTION INTRAMUSCULAR; INTRAVENOUS ONCE
Status: DISCONTINUED | OUTPATIENT
Start: 2025-02-14 | End: 2025-02-14

## 2025-02-14 RX ORDER — PHENOBARBITAL SODIUM 65 MG/ML
130 INJECTION, SOLUTION INTRAMUSCULAR; INTRAVENOUS ONCE
Status: DISCONTINUED | OUTPATIENT
Start: 2025-02-14 | End: 2025-02-14

## 2025-02-14 RX ORDER — FOLIC ACID 1 MG/1
1 TABLET ORAL ONCE
Status: COMPLETED | OUTPATIENT
Start: 2025-02-14 | End: 2025-02-14

## 2025-02-14 RX ORDER — LANOLIN ALCOHOL/MO/W.PET/CERES
100 CREAM (GRAM) TOPICAL ONCE
Status: COMPLETED | OUTPATIENT
Start: 2025-02-14 | End: 2025-02-14

## 2025-02-14 RX ORDER — MULTIVIT-MIN/IRON FUM/FOLIC AC 7.5 MG-4
1 TABLET ORAL ONCE
Status: COMPLETED | OUTPATIENT
Start: 2025-02-14 | End: 2025-02-14

## 2025-02-14 RX ADMIN — PHENOBARBITAL SODIUM 260 MG: 65 INJECTION INTRAMUSCULAR; INTRAVENOUS at 18:51

## 2025-02-14 RX ADMIN — Medication 100 MG: at 18:29

## 2025-02-14 RX ADMIN — PHENOBARBITAL SODIUM 130 MG: 65 INJECTION INTRAMUSCULAR; INTRAVENOUS at 21:27

## 2025-02-14 RX ADMIN — Medication 1 TABLET: at 18:29

## 2025-02-14 RX ADMIN — DICYCLOMINE HYDROCHLORIDE 20 MG: 20 INJECTION, SOLUTION INTRAMUSCULAR at 18:20

## 2025-02-14 RX ADMIN — KETOROLAC TROMETHAMINE 15 MG: 30 INJECTION, SOLUTION INTRAMUSCULAR at 18:20

## 2025-02-14 RX ADMIN — IOHEXOL 75 ML: 350 INJECTION, SOLUTION INTRAVENOUS at 19:31

## 2025-02-14 RX ADMIN — ONDANSETRON 4 MG: 2 INJECTION, SOLUTION INTRAMUSCULAR; INTRAVENOUS at 18:20

## 2025-02-14 RX ADMIN — SODIUM CHLORIDE 1000 ML: 900 INJECTION, SOLUTION INTRAVENOUS at 18:20

## 2025-02-14 RX ADMIN — FOLIC ACID 1 MG: 1 TABLET ORAL at 18:29

## 2025-02-14 ASSESSMENT — LIFESTYLE VARIABLES
TOTAL SCORE: 5
TREMOR: NO TREMOR
PAROXYSMAL SWEATS: NO SWEAT VISIBLE
ORIENTATION AND CLOUDING OF SENSORIUM: ORIENTED AND CAN DO SERIAL ADDITIONS
BLOOD PRESSURE: 160/90
PULSE: 64
HEADACHE, FULLNESS IN HEAD: NOT PRESENT
AGITATION: NORMAL ACTIVITY
ANXIETY: MILDLY ANXIOUS
ORIENTATION AND CLOUDING OF SENSORIUM: ORIENTED AND CAN DO SERIAL ADDITIONS
TREMOR: NOT VISIBLE, BUT CAN BE FELT FINGERTIP TO FINGERTIP
VISUAL DISTURBANCES: NOT PRESENT
PAROXYSMAL SWEATS: NO SWEAT VISIBLE
AUDITORY DISTURBANCES: NOT PRESENT
NAUSEA AND VOMITING: MILD NAUSEA WITH NO VOMITING
TOTAL SCORE: 4
NAUSEA AND VOMITING: 2
VISUAL DISTURBANCES: NOT PRESENT
PULSE: 59
AGITATION: NORMAL ACTIVITY
HEADACHE, FULLNESS IN HEAD: MILD
AUDITORY DISTURBANCES: NOT PRESENT
BLOOD PRESSURE: 133/85
ANXIETY: 2

## 2025-02-14 ASSESSMENT — PAIN SCALES - GENERAL
PAINLEVEL_OUTOF10: 3
PAINLEVEL_OUTOF10: 2

## 2025-02-14 ASSESSMENT — PAIN DESCRIPTION - FREQUENCY: FREQUENCY: CONSTANT/CONTINUOUS

## 2025-02-14 ASSESSMENT — COLUMBIA-SUICIDE SEVERITY RATING SCALE - C-SSRS
2. HAVE YOU ACTUALLY HAD ANY THOUGHTS OF KILLING YOURSELF?: NO
1. IN THE PAST MONTH, HAVE YOU WISHED YOU WERE DEAD OR WISHED YOU COULD GO TO SLEEP AND NOT WAKE UP?: NO
6. HAVE YOU EVER DONE ANYTHING, STARTED TO DO ANYTHING, OR PREPARED TO DO ANYTHING TO END YOUR LIFE?: NO

## 2025-02-14 ASSESSMENT — PAIN - FUNCTIONAL ASSESSMENT
PAIN_FUNCTIONAL_ASSESSMENT: 0-10
PAIN_FUNCTIONAL_ASSESSMENT: 0-10

## 2025-02-14 ASSESSMENT — PAIN DESCRIPTION - LOCATION: LOCATION: ABDOMEN

## 2025-02-14 ASSESSMENT — PAIN DESCRIPTION - ONSET: ONSET: AWAKENED FROM SLEEP

## 2025-02-14 NOTE — ED PROVIDER NOTES
HPI   Chief Complaint   Patient presents with    Abdominal Pain     Abd pain nausea and vomiting for last 36 hours       HPI    Patient is a 29-year-old male with a history of alcohol abuse presenting for evaluation of abdominal pain, nausea and vomiting for the past 36 hours.  Patient states that his last drink was on Wednesday.  He states he is unsure if he is going through withdrawal at this time.  He states that he attempted to contact his sponsor prior to coming to the emergency department.  He states that he started experiencing some numbness and tingling in his hands today and this was worrisome to him as this is usually what occurs prior to the patient having a withdrawal associated seizure.  He states he has upper epigastric abdominal pain without identifiable alleviating or aggravating factors.  He denies any diarrhea.  No fevers or chills.  No chest pain or shortness of breath.  He states he is not interested in speaking to a  for inpatient detoxification at this time.    Patient History   No past medical history on file.  No past surgical history on file.  No family history on file.  Social History     Tobacco Use    Smoking status: Never    Smokeless tobacco: Never   Substance Use Topics    Alcohol use: Yes     Alcohol/week: 14.0 standard drinks of alcohol     Types: 12 Cans of beer, 2 Shots of liquor per week    Drug use: Yes     Types: Marijuana       Physical Exam   ED Triage Vitals [02/14/25 1805]   Temperature Heart Rate Respirations BP   36.4 °C (97.5 °F) 77 16 (!) 160/93      Pulse Ox Temp Source Heart Rate Source Patient Position   97 % Oral Monitor Sitting      BP Location FiO2 (%)     Right arm --       Physical Exam  Vitals and nursing note reviewed.   Constitutional:       Appearance: Normal appearance.   HENT:      Head: Normocephalic and atraumatic.   Eyes:      Extraocular Movements: Extraocular movements intact.      Conjunctiva/sclera: Conjunctivae normal.      Pupils:  Pupils are equal, round, and reactive to light.   Cardiovascular:      Rate and Rhythm: Normal rate and regular rhythm.   Pulmonary:      Effort: Pulmonary effort is normal.      Breath sounds: Normal breath sounds.   Abdominal:      General: Abdomen is flat. Bowel sounds are normal.      Palpations: Abdomen is soft.      Comments: Tenderness to palpation of the upper epigastric region of the abdomen without rebound, rigidity or guarding.   Skin:     General: Skin is warm and dry.   Neurological:      Mental Status: He is alert.           ED Course & MDM   ED Course as of 02/14/25 2109 Fri Feb 14, 2025 2033 I did update patient on his results at this time.  He did give a urine sample that was pending given the signs for possible cystitis on CT.  Patient states that he would like to try a phenobarbital taper outpatient for management of his alcohol withdrawal symptoms.  He is also requesting a prescription for Zofran to aid with nausea relief.  I discussed with patient that I would be sending him home with these medications in addition to an antibiotic if needed pending his UA result.  Patient was understanding. [RP]      ED Course User Index  [RP] Sunita Méndez PA-C         Diagnoses as of 02/14/25 2109   Abdominal pain, epigastric   Nausea and vomiting, unspecified vomiting type   Alcohol withdrawal syndrome without complication (Multi)                 No data recorded     [place] Coma Scale Score: 15 (02/14/25 1820 : Suzanne Rizzo RN)                           Medical Decision Making    Parts of this chart have been completed using voice recognition software. Please excuse any errors of transcription. Despite the medical decision making time stamp above-my medical decision making has taken place during the patient's entire visit. My thought process and reason for plan has been formulated from the time that I saw the patient until the time of disposition and is not specific to one specific moment during  their visit and furthermore my MDM encompasses this entire chart and not only this text box.      HPI: Detailed above.    Exam: A medically appropriate exam performed, outlined above, given the known history and presentation.    History obtained from: Patient    Social Determinants of Health considered during this visit: Coming from home    EKG interpreted by my attending physician, reviewed by myself.    Labs Reviewed   CBC WITH AUTO DIFFERENTIAL - Abnormal       Result Value    WBC 5.8      nRBC 0.0      RBC 5.06      Hemoglobin 15.1      Hematocrit 42.5      MCV 84      MCH 29.8      MCHC 35.5      RDW 12.9      Platelets 229      Neutrophils % 80.4      Immature Granulocytes %, Automated 0.2      Lymphocytes % 10.8      Monocytes % 8.1      Eosinophils % 0.0      Basophils % 0.5      Neutrophils Absolute 4.69      Immature Granulocytes Absolute, Automated 0.01      Lymphocytes Absolute 0.63 (*)     Monocytes Absolute 0.47      Eosinophils Absolute 0.00      Basophils Absolute 0.03     COMPREHENSIVE METABOLIC PANEL - Abnormal    Glucose 115 (*)     Sodium 143      Potassium 4.2      Chloride 97 (*)     Bicarbonate 24      Anion Gap 26 (*)     Urea Nitrogen 15      Creatinine 0.83      eGFR >90      Calcium 9.9      Albumin 4.6      Alkaline Phosphatase 59      Total Protein 7.4      AST 23      Bilirubin, Total 1.1      ALT 23     LIPASE - Abnormal    Lipase 8 (*)     Narrative:     Venipuncture immediately after or during the administration of Metamizole may lead to falsely low results. Testing should be performed immediately prior to Metamizole dosing.   URINALYSIS WITH REFLEX CULTURE AND MICROSCOPIC - Abnormal    Color, Urine Light-Yellow      Appearance, Urine Clear      Specific Gravity, Urine >1.050 (*)     pH, Urine 7.5      Protein, Urine 50 (1+) (*)     Glucose, Urine Normal      Blood, Urine NEGATIVE      Ketones, Urine 20 (1+) (*)     Bilirubin, Urine NEGATIVE      Urobilinogen, Urine Normal       Nitrite, Urine NEGATIVE      Leukocyte Esterase, Urine NEGATIVE     MAGNESIUM - Normal    Magnesium 1.82     SARS-COV-2 AND INFLUENZA A/B PCR - Normal    Flu A Result Not Detected      Flu B Result Not Detected      Coronavirus 2019, PCR Not Detected      Narrative:     This assay is an FDA-cleared, in vitro diagnostic nucleic acid amplification test for the qualitative detection and differentiation of SARS CoV-2/ Influenza A/B from nasopharyngeal specimens collected from individuals with signs and symptoms of respiratory tract infections, and has been validated for use at [facility]. Negative results do not preclude COVID-19/ Influenza A/B infections and should not be used as the sole basis for diagnosis, treatment, or other management decisions. Testing for SARS CoV-2 is recommended only for patients who meet current clinical and/or epidemiological criteria defined by federal, state, or local public health directives.   URINALYSIS WITH REFLEX CULTURE AND MICROSCOPIC    Narrative:     The following orders were created for panel order Urinalysis with Reflex Culture and Microscopic.  Procedure                               Abnormality         Status                     ---------                               -----------         ------                     Urinalysis with Reflex C...[[id number]]  Abnormal            Final result               Extra Urine Gray Tube[[id number]]                                                         Please view results for these tests on the individual orders.   EXTRA URINE GRAY TUBE   URINALYSIS MICROSCOPIC WITH REFLEX CULTURE    WBC, Urine NONE      RBC, Urine 1-2      Mucus, Urine FEW       CT abdomen pelvis w IV contrast   Final Result   No evidence of acute pancreatitis, bowel obstruction or other acute   abdominopelvic process.        Nonspecific bladder wall thickening; please correlate with cystitis.        MACRO:   None        Signed by: Kwaku Christopher  2/14/2025 8:16 PM   Dictation workstation:   [id number]        Medications   sodium chloride 0.9 % bolus 1,000 mL (0 mL intravenous Stopped 2/14/25 1930)   ondansetron (Zofran) injection 4 mg (4 mg intravenous Given 2/14/25 1820)   ketorolac (Toradol) injection 15 mg (15 mg intravenous Given 2/14/25 1820)   dicyclomine (Bentyl) injection 20 mg (20 mg intramuscular Given 2/14/25 1820)   folic acid (Folvite) tablet 1 mg (1 mg oral Given 2/14/25 1829)   multivitamin with minerals 1 tablet (1 tablet oral Given 2/14/25 1829)   thiamine (Vitamin B-1) tablet 100 mg (100 mg oral Given 2/14/25 1829)   PHENobarbital (Luminal) injection 260 mg (260 mg intravenous Given 2/14/25 1851)   iohexol (OMNIPaque) 350 mg iodine/mL solution 75 mL (75 mL intravenous Given 2/14/25 1931)     Differential diagnoses considered for this visit include: Alcohol withdrawal versus electrolyte imbalance versus metabolic disturbance versus acute pancreatitis versus viral gastroenteritis    Considerations/further MDM:    Patient is a 29-year-old male with a history of alcohol abuse presenting for evaluation of abdominal pain, nausea, vomiting and numbness and tingling in his hands.  Vitals do appreciate mild hypertension with a pressure 160/93.  Remaining vital signs stable.  Patient is resting comfortably in the bed on my evaluation.  Heart and lung sounds are normal with a tender abdomen to the epigastric region.  [facility] protocol initiated with labs and CT abdomen pelvis with IV contrast for diagnostic imaging.  Bentyl, Toradol, Zofran, folic acid, multivitamin and thiamine administered with 1 L normal saline fluids for hydration. I did consult with my attending physician Dr. Kellen Silverio regarding administration of phenobarbital, and she did recommend it to prevent onset of seizure.  260 mg phenobarbital IV was additionally ordered and pushed on patient arrival.    CBC shows no leukocytosis or anemia.  CMP demonstrates balanced  electrolytes with normal liver and kidney function.  Magnesium was within normal limits.  Lipase was unremarkable.  Influenza and COVID testing were negative. CT abdomen pelvis with IV contrast shows no evidence of acute pancreatitis, bowel obstruction or other acute abdominal pelvic processes.  There is nonspecific bladder wall thickening with recommendations to correlate for cystitis. Urinalysis was negative for infection.  Results were discussed with patient at the bedside.  He will be sent a phenobarbital taper to his pharmacy for manage of his withdrawal symptoms in addition to Zofran for management of his nausea.  Return precautions were discussed with the patient.  He was discharged in good condition.    All of the patient's questions were answered to the best of my ability. Patient states understanding that they have been screened for an emergency today, and we have not found any etiology of symptoms that requires emergent treatment or admission to the hospital at this point. They understand that they have not had definitive care day and require follow-up for treatment of their condition. They also state understanding that they may have an emergent condition that may potentially have not of detected at this visit and they must return to the emergency department if they develop any worsening of symptoms or new complaints.    Patient was seen in conjunction with attending physician Dr. Kellen Silverio.   Patient's history, physical exam, diagnostic studies, and treatment plan were discussed thoroughly.  Procedure  Procedures     Sunita Méndez PA-C  02/14/25 2058       Sunita Méndez PA-C  02/14/25 2106

## 2025-02-15 NOTE — DISCHARGE INSTRUCTIONS
Thank you for choosing [facility] for your healthcare needs today!  You have been sent a prescription for phenobarbital for management of your alcohol withdrawal symptoms outpatient.  You have also been sent a prescription for Zofran to manage her nausea outpatient.  Follow-up with your primary care provider as recommended after today's visit.  Return to the emergency department should symptoms change or worsen.